# Patient Record
Sex: FEMALE | Race: WHITE | HISPANIC OR LATINO
[De-identification: names, ages, dates, MRNs, and addresses within clinical notes are randomized per-mention and may not be internally consistent; named-entity substitution may affect disease eponyms.]

---

## 2021-11-19 PROBLEM — Z00.00 ENCOUNTER FOR PREVENTIVE HEALTH EXAMINATION: Status: ACTIVE | Noted: 2021-11-19

## 2021-12-05 ENCOUNTER — NON-APPOINTMENT (OUTPATIENT)
Age: 61
End: 2021-12-05

## 2021-12-06 ENCOUNTER — NON-APPOINTMENT (OUTPATIENT)
Age: 61
End: 2021-12-06

## 2021-12-06 ENCOUNTER — APPOINTMENT (OUTPATIENT)
Dept: VASCULAR SURGERY | Facility: CLINIC | Age: 61
End: 2021-12-06
Payer: COMMERCIAL

## 2021-12-06 DIAGNOSIS — I87.2 VENOUS INSUFFICIENCY (CHRONIC) (PERIPHERAL): ICD-10-CM

## 2021-12-06 PROCEDURE — 93971 EXTREMITY STUDY: CPT

## 2021-12-06 PROCEDURE — 99204 OFFICE O/P NEW MOD 45 MIN: CPT

## 2021-12-06 NOTE — HISTORY OF PRESENT ILLNESS
[FreeTextEntry1] : 62 yo female with history of venous insufficiency s/p multiple ablations completed at an outside hospital presents for evaluation of recurrent painful varicose veins of the left lower extremity.  pt reports she had a history of svt prior to the first intervention.  pt denies any history of dvt, ulcers or bleeding varicose veins.  \par pt states that elevating the lower extremities does help with the pain \par she does not wear compression stockings\par

## 2021-12-06 NOTE — ASSESSMENT
[FreeTextEntry1] : 60 yo female with history of venous insufficiency s/p multiple ablations completed at an outside hospital presents for evaluation of recurrent painful varicose veins of the left lower extremity\par \par venous duplex shows reflux in short segment of the proximal aaas and becomes very tortuous \par \par at this time given multiple procedures would recommend conservative management with compression stockings if no improvement would further evaluate with ct venogram of the abd/pelvis to rule out any pelvic congestion syndrome \par pt to follow up in 3 months

## 2021-12-06 NOTE — PHYSICAL EXAM
[2+] : left 2+ [Varicose Veins Of Lower Extremities] : present [Varicose Veins Of The Left Leg] : of the left leg [Ankle Swelling Bilaterally] : severe [No Rash or Lesion] : No rash or lesion [Alert] : alert [Calm] : calm [JVD] : no jugular venous distention  [Normal Breath Sounds] : Normal breath sounds [Normal Heart Sounds] : normal heart sounds [Ankle Swelling (On Exam)] : not present [] : not present [Abdomen Masses] : No abdominal masses [Skin Ulcer] : no ulcer [de-identified] : appears well  [de-identified] : mild calf tenderness

## 2022-03-07 ENCOUNTER — APPOINTMENT (OUTPATIENT)
Dept: VASCULAR SURGERY | Facility: CLINIC | Age: 62
End: 2022-03-07

## 2022-04-22 ENCOUNTER — RESULT REVIEW (OUTPATIENT)
Age: 62
End: 2022-04-22

## 2022-12-08 ENCOUNTER — APPOINTMENT (OUTPATIENT)
Dept: UROGYNECOLOGY | Facility: CLINIC | Age: 62
End: 2022-12-08

## 2022-12-08 VITALS
HEIGHT: 65 IN | BODY MASS INDEX: 39.15 KG/M2 | OXYGEN SATURATION: 98 % | HEART RATE: 69 BPM | WEIGHT: 235 LBS | TEMPERATURE: 97.6 F | SYSTOLIC BLOOD PRESSURE: 130 MMHG | DIASTOLIC BLOOD PRESSURE: 80 MMHG

## 2022-12-08 DIAGNOSIS — N39.46 MIXED INCONTINENCE: ICD-10-CM

## 2022-12-08 DIAGNOSIS — N95.2 POSTMENOPAUSAL ATROPHIC VAGINITIS: ICD-10-CM

## 2022-12-08 DIAGNOSIS — R39.11 HESITANCY OF MICTURITION: ICD-10-CM

## 2022-12-08 DIAGNOSIS — R35.0 FREQUENCY OF MICTURITION: ICD-10-CM

## 2022-12-08 LAB
BILIRUB UR QL STRIP: NORMAL
CLARITY UR: CLEAR
COLLECTION METHOD: NORMAL
GLUCOSE UR-MCNC: NORMAL
HCG UR QL: 0.2 EU/DL
HGB UR QL STRIP.AUTO: NORMAL
KETONES UR-MCNC: NORMAL
LEUKOCYTE ESTERASE UR QL STRIP: NORMAL
NITRITE UR QL STRIP: NORMAL
PH UR STRIP: 5.5
PROT UR STRIP-MCNC: NORMAL
SP GR UR STRIP: >=1.03

## 2022-12-08 PROCEDURE — 99204 OFFICE O/P NEW MOD 45 MIN: CPT | Mod: 25

## 2022-12-08 PROCEDURE — 51736 URINE FLOW MEASUREMENT: CPT

## 2022-12-08 PROCEDURE — 51725 SIMPLE CYSTOMETROGRAM: CPT

## 2022-12-08 RX ORDER — TROSPIUM CHLORIDE 60 MG/1
60 CAPSULE, EXTENDED RELEASE ORAL
Qty: 90 | Refills: 3 | Status: ACTIVE | COMMUNITY
Start: 2022-12-08 | End: 1900-01-01

## 2022-12-08 NOTE — HISTORY OF PRESENT ILLNESS
[FreeTextEntry1] : \joycelyn Is a 62-year-old woman with mixed incontinence symptoms overactive bladder symptoms greater than stress incontinence.  Urinary loss with urge or spontaneous is dominant although there is rare urinary loss with effort.\par \par Does not over hydrate and has \par 1 cup of coffee daily examination there is high body mass\par \par \par The labia are hypertrophic and opposed in the midline and the urethra is quite proximal and a narrow vagina.\par Mild-moderate atrophic changes are noted but the surface of the vulva and vagina is intact although mildly sensitive.\par \par Is no evidence of prolapse.\par \par \par TRANS-URETHRAL BLADDER CATHERIZATION: Due to symptoms of hesitancy, to rule out UTI, and to rule our retention, sterile prep and bladder catherization was performed.\par Post Void Residual volume was 5   cc.\par \par Urine analysis demonstrated  negative\par \par \par DIAGNOSTIC PROCEDURE: SIMPLE CYSTOMETRY :\par  -diagnostic test indicated as general examination, history, urine analysis and  microscopy failed to explain patient complaints\par \par In the supine position, the urethra was prepped with Betadine. A 16 [place] catheter was gently passed into the bladder with sterile technique and secured in place.\par A urine sample was obtained via catheterization. Sterile water was infused by gravity via an irrigation syringe.\par Patient sensation, change in flow rate, and capacity were observed: \par \par First Sensation  40       (cc)      First Urgency  70       Increase Urge  100         Capacity   250     \par Pain with Fill:  NEGATIVE   \par Sensory Urgency:         severe\par Post Fill Void   complete - good flow pattern.                     \par \par Involuntary detrusor contractions:  none,   equivocal,   equivocal favoring contractions present\par \par Cough Stress Test :    negative\par \par Diagnoses: \par \par Clinical mixed incontinence -overactive bladder dominant\par Overactive bladder dominant on simple CMG and cough stress testing\par Cough testing negative.  \par \par \par \par \par TREATMENT PLAN\par \par \par Vitamin C 1000 mg daily\par Cranberry tablets 400mg twice a day. \par \par Decrease coffee, alcohol, diet sweetener, citrus, spicy foods - these all stimulate voiding\par NEW Prescription:   Sanctura (Trospium) one tablet daily - start taking with a stool softener to avoid constipation .  If effective and stool soft can try without stool softener after 1-2 weeks. \par \par Considerations next steps for frequency / overactive bladder:  , Botox, PTNS  Each modality discussed with patient in detail \par \par Consider estrogen cream for atrophy

## 2023-01-19 ENCOUNTER — APPOINTMENT (OUTPATIENT)
Dept: UROGYNECOLOGY | Facility: CLINIC | Age: 63
End: 2023-01-19